# Patient Record
Sex: FEMALE | Race: BLACK OR AFRICAN AMERICAN | NOT HISPANIC OR LATINO
[De-identification: names, ages, dates, MRNs, and addresses within clinical notes are randomized per-mention and may not be internally consistent; named-entity substitution may affect disease eponyms.]

---
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
## 2024-08-04 ENCOUNTER — NON-APPOINTMENT (OUTPATIENT)
Age: 19
End: 2024-08-04

## 2024-08-19 ENCOUNTER — APPOINTMENT (OUTPATIENT)
Dept: ORTHOPEDIC SURGERY | Facility: CLINIC | Age: 19
End: 2024-08-19
Payer: COMMERCIAL

## 2024-08-19 DIAGNOSIS — M25.529 PAIN IN UNSPECIFIED ELBOW: ICD-10-CM

## 2024-08-19 PROCEDURE — 99204 OFFICE O/P NEW MOD 45 MIN: CPT

## 2024-08-20 PROBLEM — M25.529 ELBOW PAIN: Status: ACTIVE | Noted: 2024-08-20

## 2024-08-20 NOTE — IMAGING
[de-identified] : RIGHT ELBOW EXAM  Skin intact No deformity, edema, or ecchymosis Hand with brisk capillary refill, warm and well perfused Non-tender Motor function intact to AIN, PIN, ulnar nerves Sensation intact median, ulnar, radial nerves  Elbow ROM   Flexion 135   Extension to 0   Supination 85   Pronation 85  No elbow instability noted Strength for elbow flexion & extension is 5/5 Hand and wrist motion is intact and full Patient able to make a composite fist  Right elbow radiographs with no fracture nor dislocation, aligned radiocapitellar and ulnohumeral joints. (3-view as viewed from outside facility)

## 2024-08-20 NOTE — ASSESSMENT
[FreeTextEntry1] : Right Lateral Epicondylitis The diagnosis of lateral epicondylitis and treatment options were discussed at length with the patient today.  I discussed that in terms of the natural history of the condition, it can sometimes take many months to improve.  I discussed treatment options including observation, activity modification including avoiding lifting with the hand pronated and instead lifting with the hand supinated, oral anti-inflammatories, hand therapy, counterforce brace, and steroid injection.  I discussed that for the steroid injection, the literature proves this to be no better than a placebo, however, it is still a potential option for the patient.  Furthermore, if this condition is recalcitrant, I discussed obtaining an MRI scan to assess both the lateral epicondyle as well as the radiocapitellar joint and specifically for a potential plica that could be impinging on the radiocapitellar joint. All of the patient's questions were answered to their satisfaction.  F/u 3mo

## 2024-08-20 NOTE — IMAGING
[de-identified] : RIGHT ELBOW EXAM  Skin intact No deformity, edema, or ecchymosis Hand with brisk capillary refill, warm and well perfused Non-tender Motor function intact to AIN, PIN, ulnar nerves Sensation intact median, ulnar, radial nerves  Elbow ROM   Flexion 135   Extension to 0   Supination 85   Pronation 85  No elbow instability noted Strength for elbow flexion & extension is 5/5 Hand and wrist motion is intact and full Patient able to make a composite fist  Right elbow radiographs with no fracture nor dislocation, aligned radiocapitellar and ulnohumeral joints. (3-view as viewed from outside facility)

## 2024-08-20 NOTE — HISTORY OF PRESENT ILLNESS
[de-identified] : Age: 19 year F PMHx: none Hand Dominance: RHD Chief Complaint: Right elbow pain onset approx. July 2024 with NKI. Patient reports that her symptoms onset with no precipitating factors. Patient reports intermittent shooting pains on the right elbow along with some intermittent tingling, prompting her to get evaluated. Patient was seen at [facility] 08/05/24 where she had radiographs performed that were benign. Patient states that her pain has improved since that visit, but would still like to be evaluated. Denies numbness. Trauma: NKI Outside Imaging/Treatment: Radiographs from [facility] 08/05/24 OTC Medications: none OT/PT: none Bracing: none Pain worse with: exertion Pain better with: rest

## 2024-08-20 NOTE — HISTORY OF PRESENT ILLNESS
[de-identified] : Age: 19 year F PMHx: none Hand Dominance: RHD Chief Complaint: Right elbow pain onset approx. July 2024 with NKI. Patient reports that her symptoms onset with no precipitating factors. Patient reports intermittent shooting pains on the right elbow along with some intermittent tingling, prompting her to get evaluated. Patient was seen at [facility] 08/05/24 where she had radiographs performed that were benign. Patient states that her pain has improved since that visit, but would still like to be evaluated. Denies numbness. Trauma: NKI Outside Imaging/Treatment: Radiographs from [facility] 08/05/24 OTC Medications: none OT/PT: none Bracing: none Pain worse with: exertion Pain better with: rest